# Patient Record
Sex: MALE | URBAN - METROPOLITAN AREA
[De-identification: names, ages, dates, MRNs, and addresses within clinical notes are randomized per-mention and may not be internally consistent; named-entity substitution may affect disease eponyms.]

---

## 2019-02-11 ENCOUNTER — APPOINTMENT (RX ONLY)
Dept: URBAN - METROPOLITAN AREA TELEMEDICINE 2 | Facility: TELEMEDICINE | Age: 59
Setting detail: DERMATOLOGY
End: 2019-02-11

## 2019-02-11 DIAGNOSIS — L85.3 XEROSIS CUTIS: ICD-10-CM

## 2019-02-11 DIAGNOSIS — D22 MELANOCYTIC NEVI: ICD-10-CM

## 2019-02-11 DIAGNOSIS — L81.4 OTHER MELANIN HYPERPIGMENTATION: ICD-10-CM

## 2019-02-11 DIAGNOSIS — D18.0 HEMANGIOMA: ICD-10-CM

## 2019-02-11 DIAGNOSIS — L73.8 OTHER SPECIFIED FOLLICULAR DISORDERS: ICD-10-CM

## 2019-02-11 DIAGNOSIS — L57.0 ACTINIC KERATOSIS: ICD-10-CM

## 2019-02-11 DIAGNOSIS — Z71.89 OTHER SPECIFIED COUNSELING: ICD-10-CM

## 2019-02-11 DIAGNOSIS — L82.1 OTHER SEBORRHEIC KERATOSIS: ICD-10-CM

## 2019-02-11 PROBLEM — D22.72 MELANOCYTIC NEVI OF LEFT LOWER LIMB, INCLUDING HIP: Status: ACTIVE | Noted: 2019-02-11

## 2019-02-11 PROBLEM — D22.71 MELANOCYTIC NEVI OF RIGHT LOWER LIMB, INCLUDING HIP: Status: ACTIVE | Noted: 2019-02-11

## 2019-02-11 PROBLEM — D48.5 NEOPLASM OF UNCERTAIN BEHAVIOR OF SKIN: Status: ACTIVE | Noted: 2019-02-11

## 2019-02-11 PROBLEM — D18.01 HEMANGIOMA OF SKIN AND SUBCUTANEOUS TISSUE: Status: ACTIVE | Noted: 2019-02-11

## 2019-02-11 PROBLEM — D22.5 MELANOCYTIC NEVI OF TRUNK: Status: ACTIVE | Noted: 2019-02-11

## 2019-02-11 PROBLEM — D23.61 OTHER BENIGN NEOPLASM OF SKIN OF RIGHT UPPER LIMB, INCLUDING SHOULDER: Status: ACTIVE | Noted: 2019-02-11

## 2019-02-11 PROCEDURE — ? COUNSELING

## 2019-02-11 PROCEDURE — 99203 OFFICE O/P NEW LOW 30 MIN: CPT | Mod: 25

## 2019-02-11 PROCEDURE — 11102 TANGNTL BX SKIN SINGLE LES: CPT

## 2019-02-11 PROCEDURE — ? SUNSCREEN RECOMMENDATIONS

## 2019-02-11 PROCEDURE — ? LIQUID NITROGEN

## 2019-02-11 PROCEDURE — 17000 DESTRUCT PREMALG LESION: CPT | Mod: 59

## 2019-02-11 PROCEDURE — ? BIOPSY BY SHAVE METHOD

## 2019-02-11 PROCEDURE — 17003 DESTRUCT PREMALG LES 2-14: CPT

## 2019-02-11 ASSESSMENT — LOCATION DETAILED DESCRIPTION DERM
LOCATION DETAILED: PERIUMBILICAL SKIN
LOCATION DETAILED: LEFT INFERIOR LATERAL NECK
LOCATION DETAILED: LEFT CENTRAL MALAR CHEEK
LOCATION DETAILED: RIGHT PROXIMAL POSTERIOR UPPER ARM
LOCATION DETAILED: RIGHT CLAVICULAR NECK
LOCATION DETAILED: RIGHT ANTERIOR SHOULDER
LOCATION DETAILED: LEFT MID-UPPER BACK
LOCATION DETAILED: RIGHT SUPERIOR UPPER BACK
LOCATION DETAILED: LEFT VENTRAL PROXIMAL FOREARM
LOCATION DETAILED: RIGHT PROXIMAL DORSAL FOREARM
LOCATION DETAILED: LEFT PROXIMAL POSTERIOR UPPER ARM
LOCATION DETAILED: LEFT MEDIAL INFERIOR CHEST
LOCATION DETAILED: STERNUM
LOCATION DETAILED: LEFT PROXIMAL CALF
LOCATION DETAILED: LEFT DISTAL DORSAL FOREARM
LOCATION DETAILED: LEFT ANTERIOR DISTAL THIGH
LOCATION DETAILED: LEFT ANTERIOR SHOULDER
LOCATION DETAILED: RIGHT SUPERIOR LATERAL UPPER BACK
LOCATION DETAILED: LEFT PROXIMAL DORSAL FOREARM
LOCATION DETAILED: RIGHT CENTRAL MALAR CHEEK
LOCATION DETAILED: LEFT ULNAR DORSAL HAND
LOCATION DETAILED: LEFT SUPERIOR LATERAL UPPER BACK
LOCATION DETAILED: RIGHT LATERAL INFERIOR CHEST
LOCATION DETAILED: LEFT LATERAL SUPERIOR CHEST
LOCATION DETAILED: LEFT SUPERIOR MEDIAL MIDBACK
LOCATION DETAILED: RIGHT MID-UPPER BACK
LOCATION DETAILED: RIGHT DISTAL CALF
LOCATION DETAILED: RIGHT ANTERIOR DISTAL THIGH
LOCATION DETAILED: RIGHT SUPERIOR MEDIAL MIDBACK
LOCATION DETAILED: LEFT FOREHEAD
LOCATION DETAILED: LEFT RADIAL DORSAL HAND

## 2019-02-11 ASSESSMENT — LOCATION SIMPLE DESCRIPTION DERM
LOCATION SIMPLE: LEFT LOWER BACK
LOCATION SIMPLE: LEFT CALF
LOCATION SIMPLE: LEFT SHOULDER
LOCATION SIMPLE: LEFT UPPER BACK
LOCATION SIMPLE: LEFT ANTERIOR NECK
LOCATION SIMPLE: LEFT HAND
LOCATION SIMPLE: LEFT FOREHEAD
LOCATION SIMPLE: LEFT THIGH
LOCATION SIMPLE: LEFT CHEEK
LOCATION SIMPLE: ABDOMEN
LOCATION SIMPLE: RIGHT LOWER BACK
LOCATION SIMPLE: RIGHT UPPER ARM
LOCATION SIMPLE: RIGHT ANTERIOR NECK
LOCATION SIMPLE: RIGHT SHOULDER
LOCATION SIMPLE: RIGHT CALF
LOCATION SIMPLE: CHEST
LOCATION SIMPLE: RIGHT UPPER BACK
LOCATION SIMPLE: RIGHT FOREARM
LOCATION SIMPLE: LEFT UPPER ARM
LOCATION SIMPLE: RIGHT THIGH
LOCATION SIMPLE: RIGHT CHEEK
LOCATION SIMPLE: LEFT FOREARM

## 2019-02-11 ASSESSMENT — LOCATION ZONE DERM
LOCATION ZONE: NECK
LOCATION ZONE: ARM
LOCATION ZONE: TRUNK
LOCATION ZONE: HAND
LOCATION ZONE: FACE
LOCATION ZONE: LEG

## 2019-02-11 NOTE — HPI: FULL BODY SKIN EXAMINATION
How Severe Are Your Spot(S)?: moderate
What Type Of Note Output Would You Prefer (Optional)?: Bullet Format
What Is The Reason For Today's Visit?: Full Body Skin Examination
What Is The Reason For Today's Visit? (Being Monitored For X): concerning skin lesions on an annual basis
Additional History: Mother had skin cancer, patient is unsure which kind.

## 2019-02-11 NOTE — PROCEDURE: BIOPSY BY SHAVE METHOD
Detail Level: Detailed
Biopsy Method: Dermablade
Bill For Surgical Tray: no
Lab: [id number]
Hemostasis: Aluminum Chloride
Curettage Text: The wound bed was treated with curettage after the biopsy was performed.
Was A Bandage Applied: Yes
Silver Nitrate Text: The wound bed was treated with silver nitrate after the biopsy was performed.
Notification Instructions: Patient will be notified of biopsy results. However, patient instructed to call the office if not contacted within 2 weeks.
Body Location Override (Optional - Billing Will Still Be Based On Selected Body Map Location If Applicable): right mid upper back
Cryotherapy Text: The wound bed was treated with cryotherapy after the biopsy was performed.
Anesthesia Type: 1% lidocaine with epinephrine
Lab Facility: [id number]
Consent: Written consent was obtained and risks were reviewed including but not limited to scarring, infection, bleeding, scabbing, incomplete removal, nerve damage and allergy to anesthesia.
Depth Of Biopsy: dermis
Billing Type: Third-Party Bill
Size Of Lesion In Cm: 0
Electrodesiccation Text: The wound bed was treated with electrodesiccation after the biopsy was performed.
Wound Care: Vaseline
Post-Care Instructions: I reviewed with the patient in detail post-care instructions. Patient is to keep the biopsy site dry overnight, and then apply bacitracin twice daily until healed. Patient may apply hydrogen peroxide soaks to remove any crusting.
Dressing: bandage
Anesthesia Volume In Cc: 0.5
Type Of Destruction Used: Curettage
Biopsy Type: H and E
Electrodesiccation And Curettage Text: The wound bed was treated with electrodesiccation and curettage after the biopsy was performed.

## 2020-02-17 ENCOUNTER — APPOINTMENT (RX ONLY)
Dept: URBAN - METROPOLITAN AREA TELEMEDICINE 2 | Facility: TELEMEDICINE | Age: 60
Setting detail: DERMATOLOGY
End: 2020-02-17

## 2020-02-17 DIAGNOSIS — L82.1 OTHER SEBORRHEIC KERATOSIS: ICD-10-CM

## 2020-02-17 DIAGNOSIS — L57.0 ACTINIC KERATOSIS: ICD-10-CM

## 2020-02-17 DIAGNOSIS — L85.3 XEROSIS CUTIS: ICD-10-CM

## 2020-02-17 DIAGNOSIS — L90.5 SCAR CONDITIONS AND FIBROSIS OF SKIN: ICD-10-CM

## 2020-02-17 DIAGNOSIS — L82.0 INFLAMED SEBORRHEIC KERATOSIS: ICD-10-CM

## 2020-02-17 DIAGNOSIS — L81.4 OTHER MELANIN HYPERPIGMENTATION: ICD-10-CM

## 2020-02-17 DIAGNOSIS — Z71.89 OTHER SPECIFIED COUNSELING: ICD-10-CM

## 2020-02-17 PROCEDURE — ? DEFER

## 2020-02-17 PROCEDURE — ? COUNSELING

## 2020-02-17 PROCEDURE — 17110 DESTRUCTION B9 LES UP TO 14: CPT

## 2020-02-17 PROCEDURE — ? SUNSCREEN RECOMMENDATIONS

## 2020-02-17 PROCEDURE — ? LIQUID NITROGEN

## 2020-02-17 PROCEDURE — 99213 OFFICE O/P EST LOW 20 MIN: CPT | Mod: 25

## 2020-02-17 ASSESSMENT — LOCATION SIMPLE DESCRIPTION DERM
LOCATION SIMPLE: LEFT SHOULDER
LOCATION SIMPLE: RIGHT FOREHEAD
LOCATION SIMPLE: RIGHT CHEEK
LOCATION SIMPLE: RIGHT FOREARM
LOCATION SIMPLE: RIGHT UPPER BACK
LOCATION SIMPLE: LEFT FOREARM

## 2020-02-17 ASSESSMENT — LOCATION DETAILED DESCRIPTION DERM
LOCATION DETAILED: RIGHT PROXIMAL DORSAL FOREARM
LOCATION DETAILED: LEFT POSTERIOR SHOULDER
LOCATION DETAILED: LEFT PROXIMAL DORSAL FOREARM
LOCATION DETAILED: RIGHT FOREHEAD
LOCATION DETAILED: LEFT PROXIMAL RADIAL DORSAL FOREARM
LOCATION DETAILED: RIGHT DISTAL RADIAL DORSAL FOREARM
LOCATION DETAILED: RIGHT INFERIOR MEDIAL UPPER BACK
LOCATION DETAILED: RIGHT INFERIOR CENTRAL MALAR CHEEK
LOCATION DETAILED: RIGHT SUPERIOR UPPER BACK
LOCATION DETAILED: RIGHT MID-UPPER BACK
LOCATION DETAILED: LEFT DISTAL DORSAL FOREARM

## 2020-02-17 ASSESSMENT — LOCATION ZONE DERM
LOCATION ZONE: TRUNK
LOCATION ZONE: FACE
LOCATION ZONE: ARM

## 2020-02-17 NOTE — PROCEDURE: LIQUID NITROGEN
Number Of Freeze-Thaw Cycles: 3 freeze-thaw cycles
Post-Care Instructions: I reviewed with the patient in detail post-care instructions. Patient is to wear sunprotection, and avoid picking at any of the treated lesions. Pt may apply Vaseline to crusted or scabbing areas.
Detail Level: Simple
Consent: The patient's consent was obtained including but not limited to risks of crusting, scabbing, blistering, scarring, darker or lighter pigmentary change, recurrence, incomplete removal and infection.
Add 52 Modifier (Optional): no
Medical Necessity Clause: This procedure was medically necessary because the lesions that were treated were:
Include Z78.9 (Other Specified Conditions Influencing Health Status) As An Associated Diagnosis?: Yes
Medical Necessity Information: It is in your best interest to select a reason for this procedure from the list below. All of these items fulfill various CMS LCD requirements except the new and changing color options.

## 2020-03-04 ENCOUNTER — APPOINTMENT (RX ONLY)
Dept: URBAN - METROPOLITAN AREA TELEMEDICINE 2 | Facility: TELEMEDICINE | Age: 60
Setting detail: DERMATOLOGY
End: 2020-03-04

## 2020-03-04 DIAGNOSIS — L81.8 OTHER SPECIFIED DISORDERS OF PIGMENTATION: ICD-10-CM

## 2020-03-04 DIAGNOSIS — L57.0 ACTINIC KERATOSIS: ICD-10-CM

## 2020-03-04 PROCEDURE — ? COUNSELING

## 2020-03-04 PROCEDURE — ? PATIENT SPECIFIC COUNSELING

## 2020-03-04 PROCEDURE — ? LIQUID NITROGEN

## 2020-03-04 PROCEDURE — 99212 OFFICE O/P EST SF 10 MIN: CPT | Mod: 25

## 2020-03-04 PROCEDURE — 17004 DESTROY PREMAL LESIONS 15/>: CPT

## 2020-03-04 ASSESSMENT — LOCATION SIMPLE DESCRIPTION DERM
LOCATION SIMPLE: RIGHT FOREARM
LOCATION SIMPLE: LEFT CHEEK
LOCATION SIMPLE: LEFT HAND
LOCATION SIMPLE: RIGHT CHEEK
LOCATION SIMPLE: RIGHT HAND
LOCATION SIMPLE: LEFT UPPER BACK
LOCATION SIMPLE: RIGHT FOREHEAD
LOCATION SIMPLE: LEFT FOREARM
LOCATION SIMPLE: LEFT ZYGOMA
LOCATION SIMPLE: RIGHT WRIST

## 2020-03-04 ASSESSMENT — LOCATION ZONE DERM
LOCATION ZONE: HAND
LOCATION ZONE: TRUNK
LOCATION ZONE: FACE
LOCATION ZONE: ARM

## 2020-03-04 ASSESSMENT — LOCATION DETAILED DESCRIPTION DERM
LOCATION DETAILED: RIGHT INFERIOR LATERAL FOREHEAD
LOCATION DETAILED: LEFT MID-UPPER BACK
LOCATION DETAILED: RIGHT RADIAL DORSAL HAND
LOCATION DETAILED: RIGHT CENTRAL MALAR CHEEK
LOCATION DETAILED: RIGHT LATERAL DORSAL WRIST
LOCATION DETAILED: RIGHT SUPERIOR CENTRAL MALAR CHEEK
LOCATION DETAILED: LEFT MEDIAL ZYGOMA
LOCATION DETAILED: LEFT SUPERIOR MEDIAL MALAR CHEEK
LOCATION DETAILED: LEFT INFERIOR MEDIAL MALAR CHEEK
LOCATION DETAILED: RIGHT PROXIMAL DORSAL FOREARM
LOCATION DETAILED: LEFT DISTAL DORSAL FOREARM
LOCATION DETAILED: LEFT RADIAL DORSAL HAND
LOCATION DETAILED: LEFT ULNAR DORSAL HAND
LOCATION DETAILED: RIGHT SUPERIOR FOREHEAD
LOCATION DETAILED: RIGHT DISTAL DORSAL FOREARM

## 2020-03-04 NOTE — PROCEDURE: LIQUID NITROGEN
Render Post-Care Instructions In Note?: yes
Detail Level: Zone
Consent: The patient's consent was obtained including but not limited to risks of crusting, scabbing, blistering, scarring, darker or lighter pigmentary change, recurrence, incomplete removal and infection.
Duration Of Freeze Thaw-Cycle (Seconds): 5
Render Note In Bullet Format When Appropriate: No
Post-Care Instructions: I reviewed with the patient in detail post-care instructions. Patient is to wear sunprotection, and avoid picking at any of the treated lesions. Pt may apply Vaseline to crusted or scabbing areas.
Number Of Freeze-Thaw Cycles: 3 freeze-thaw cycles

## 2021-03-04 ENCOUNTER — APPOINTMENT (RX ONLY)
Dept: URBAN - METROPOLITAN AREA TELEMEDICINE 2 | Facility: TELEMEDICINE | Age: 61
Setting detail: DERMATOLOGY
End: 2021-03-04

## 2021-03-04 DIAGNOSIS — L82.1 OTHER SEBORRHEIC KERATOSIS: ICD-10-CM

## 2021-03-04 DIAGNOSIS — L81.4 OTHER MELANIN HYPERPIGMENTATION: ICD-10-CM

## 2021-03-04 DIAGNOSIS — Z80.8 FAMILY HISTORY OF MALIGNANT NEOPLASM OF OTHER ORGANS OR SYSTEMS: ICD-10-CM

## 2021-03-04 DIAGNOSIS — L57.0 ACTINIC KERATOSIS: ICD-10-CM

## 2021-03-04 DIAGNOSIS — L57.3 POIKILODERMA OF CIVATTE: ICD-10-CM

## 2021-03-04 DIAGNOSIS — L73.8 OTHER SPECIFIED FOLLICULAR DISORDERS: ICD-10-CM

## 2021-03-04 DIAGNOSIS — D18.0 HEMANGIOMA: ICD-10-CM

## 2021-03-04 DIAGNOSIS — Q826 OTHER SPECIFIED ANOMALIES OF SKIN: ICD-10-CM

## 2021-03-04 DIAGNOSIS — Q80.8 OTHER CONGENITAL ICHTHYOSIS: ICD-10-CM

## 2021-03-04 DIAGNOSIS — R60.0 LOCALIZED EDEMA: ICD-10-CM

## 2021-03-04 DIAGNOSIS — L71.8 OTHER ROSACEA: ICD-10-CM

## 2021-03-04 DIAGNOSIS — Q819 OTHER SPECIFIED ANOMALIES OF SKIN: ICD-10-CM

## 2021-03-04 DIAGNOSIS — L21.8 OTHER SEBORRHEIC DERMATITIS: ICD-10-CM

## 2021-03-04 DIAGNOSIS — Z71.89 OTHER SPECIFIED COUNSELING: ICD-10-CM

## 2021-03-04 DIAGNOSIS — Q828 OTHER SPECIFIED ANOMALIES OF SKIN: ICD-10-CM

## 2021-03-04 PROBLEM — D18.01 HEMANGIOMA OF SKIN AND SUBCUTANEOUS TISSUE: Status: ACTIVE | Noted: 2021-03-04

## 2021-03-04 PROBLEM — L85.8 OTHER SPECIFIED EPIDERMAL THICKENING: Status: ACTIVE | Noted: 2021-03-04

## 2021-03-04 PROCEDURE — 17003 DESTRUCT PREMALG LES 2-14: CPT

## 2021-03-04 PROCEDURE — ? COUNSELING

## 2021-03-04 PROCEDURE — ? LIQUID NITROGEN

## 2021-03-04 PROCEDURE — 99213 OFFICE O/P EST LOW 20 MIN: CPT | Mod: 25

## 2021-03-04 PROCEDURE — ? OTHER

## 2021-03-04 PROCEDURE — 17000 DESTRUCT PREMALG LESION: CPT

## 2021-03-04 ASSESSMENT — LOCATION DETAILED DESCRIPTION DERM
LOCATION DETAILED: RIGHT DISTAL LATERAL CALF
LOCATION DETAILED: RIGHT CENTRAL MALAR CHEEK
LOCATION DETAILED: LEFT SUPERIOR LATERAL MALAR CHEEK
LOCATION DETAILED: LEFT PROXIMAL POSTERIOR UPPER ARM
LOCATION DETAILED: LEFT ANTERIOR DISTAL UPPER ARM
LOCATION DETAILED: RIGHT PROXIMAL RADIAL DORSAL FOREARM
LOCATION DETAILED: LEFT SUPERIOR ANTERIOR NECK
LOCATION DETAILED: LEFT RADIAL DORSAL HAND
LOCATION DETAILED: LEFT PROXIMAL PRETIBIAL REGION
LOCATION DETAILED: LEFT DISTAL DORSAL FOREARM
LOCATION DETAILED: RIGHT PROXIMAL CALF
LOCATION DETAILED: LEFT MEDIAL SUPERIOR CHEST
LOCATION DETAILED: LEFT ANTERIOR PROXIMAL THIGH
LOCATION DETAILED: LEFT DISTAL POSTERIOR THIGH
LOCATION DETAILED: LEFT DISTAL POSTERIOR UPPER ARM
LOCATION DETAILED: RIGHT DISTAL DORSAL FOREARM
LOCATION DETAILED: LEFT ANTERIOR DISTAL THIGH
LOCATION DETAILED: RIGHT RADIAL DORSAL HAND
LOCATION DETAILED: LEFT ELBOW
LOCATION DETAILED: RIGHT MID TEMPLE
LOCATION DETAILED: RIGHT SUPERIOR LATERAL MALAR CHEEK
LOCATION DETAILED: LEFT LATERAL MALAR CHEEK
LOCATION DETAILED: LEFT CENTRAL MALAR CHEEK
LOCATION DETAILED: LEFT PROXIMAL CALF
LOCATION DETAILED: RIGHT ANTERIOR DISTAL UPPER ARM
LOCATION DETAILED: LEFT DISTAL CALF
LOCATION DETAILED: LEFT DISTAL PRETIBIAL REGION
LOCATION DETAILED: RIGHT DISTAL POSTERIOR THIGH

## 2021-03-04 ASSESSMENT — LOCATION SIMPLE DESCRIPTION DERM
LOCATION SIMPLE: RIGHT TEMPLE
LOCATION SIMPLE: LEFT HAND
LOCATION SIMPLE: RIGHT CALF
LOCATION SIMPLE: CHEST
LOCATION SIMPLE: LEFT ANTERIOR NECK
LOCATION SIMPLE: LEFT POSTERIOR THIGH
LOCATION SIMPLE: LEFT FOREARM
LOCATION SIMPLE: RIGHT POSTERIOR THIGH
LOCATION SIMPLE: RIGHT CHEEK
LOCATION SIMPLE: LEFT THIGH
LOCATION SIMPLE: LEFT ELBOW
LOCATION SIMPLE: LEFT CHEEK
LOCATION SIMPLE: LEFT UPPER ARM
LOCATION SIMPLE: RIGHT HAND
LOCATION SIMPLE: LEFT CALF
LOCATION SIMPLE: RIGHT UPPER ARM
LOCATION SIMPLE: RIGHT FOREARM
LOCATION SIMPLE: LEFT PRETIBIAL REGION

## 2021-03-04 ASSESSMENT — LOCATION ZONE DERM
LOCATION ZONE: NECK
LOCATION ZONE: TRUNK
LOCATION ZONE: HAND
LOCATION ZONE: LEG
LOCATION ZONE: FACE
LOCATION ZONE: ARM

## 2021-03-04 NOTE — PROCEDURE: OTHER
Render Risk Assessment In Note?: no
Other (Free Text): Mother
Detail Level: Detailed
Note Text (......Xxx Chief Complaint.): This diagnosis correlates with the

## 2021-03-04 NOTE — PROCEDURE: LIQUID NITROGEN
Render Post-Care Instructions In Note?: yes
Consent: The patient's consent was obtained including but not limited to risks of crusting, scabbing, blistering, scarring, darker or lighter pigmentary change, recurrence, incomplete removal and infection.
Detail Level: Zone
Render Note In Bullet Format When Appropriate: No
Number Of Freeze-Thaw Cycles: 3 freeze-thaw cycles
Post-Care Instructions: I reviewed with the patient in detail post-care instructions. Patient is to wear sunprotection, and avoid picking at any of the treated lesions. Pt may apply Vaseline to crusted or scabbing areas.
Duration Of Freeze Thaw-Cycle (Seconds): 5

## 2022-03-04 ENCOUNTER — APPOINTMENT (RX ONLY)
Dept: URBAN - METROPOLITAN AREA TELEMEDICINE 2 | Facility: TELEMEDICINE | Age: 62
Setting detail: DERMATOLOGY
End: 2022-03-04

## 2022-03-04 DIAGNOSIS — Q826 OTHER SPECIFIED ANOMALIES OF SKIN: ICD-10-CM

## 2022-03-04 DIAGNOSIS — L71.8 OTHER ROSACEA: ICD-10-CM

## 2022-03-04 DIAGNOSIS — L82.1 OTHER SEBORRHEIC KERATOSIS: ICD-10-CM

## 2022-03-04 DIAGNOSIS — L81.4 OTHER MELANIN HYPERPIGMENTATION: ICD-10-CM

## 2022-03-04 DIAGNOSIS — L73.8 OTHER SPECIFIED FOLLICULAR DISORDERS: ICD-10-CM

## 2022-03-04 DIAGNOSIS — D18.0 HEMANGIOMA: ICD-10-CM

## 2022-03-04 DIAGNOSIS — Z71.89 OTHER SPECIFIED COUNSELING: ICD-10-CM

## 2022-03-04 DIAGNOSIS — L28.0 LICHEN SIMPLEX CHRONICUS: ICD-10-CM

## 2022-03-04 DIAGNOSIS — Q819 OTHER SPECIFIED ANOMALIES OF SKIN: ICD-10-CM

## 2022-03-04 DIAGNOSIS — L57.3 POIKILODERMA OF CIVATTE: ICD-10-CM

## 2022-03-04 DIAGNOSIS — L21.8 OTHER SEBORRHEIC DERMATITIS: ICD-10-CM

## 2022-03-04 DIAGNOSIS — Q828 OTHER SPECIFIED ANOMALIES OF SKIN: ICD-10-CM

## 2022-03-04 DIAGNOSIS — Z80.8 FAMILY HISTORY OF MALIGNANT NEOPLASM OF OTHER ORGANS OR SYSTEMS: ICD-10-CM

## 2022-03-04 DIAGNOSIS — D485 NEOPLASM OF UNCERTAIN BEHAVIOR OF SKIN: ICD-10-CM

## 2022-03-04 DIAGNOSIS — L57.0 ACTINIC KERATOSIS: ICD-10-CM

## 2022-03-04 DIAGNOSIS — Q80.8 OTHER CONGENITAL ICHTHYOSIS: ICD-10-CM

## 2022-03-04 PROBLEM — L85.8 OTHER SPECIFIED EPIDERMAL THICKENING: Status: ACTIVE | Noted: 2022-03-04

## 2022-03-04 PROBLEM — D48.5 NEOPLASM OF UNCERTAIN BEHAVIOR OF SKIN: Status: ACTIVE | Noted: 2022-03-04

## 2022-03-04 PROBLEM — D18.01 HEMANGIOMA OF SKIN AND SUBCUTANEOUS TISSUE: Status: ACTIVE | Noted: 2022-03-04

## 2022-03-04 PROCEDURE — ? BIOPSY BY SHAVE METHOD

## 2022-03-04 PROCEDURE — 17000 DESTRUCT PREMALG LESION: CPT | Mod: 59

## 2022-03-04 PROCEDURE — ? LIQUID NITROGEN

## 2022-03-04 PROCEDURE — 11102 TANGNTL BX SKIN SINGLE LES: CPT

## 2022-03-04 PROCEDURE — 99213 OFFICE O/P EST LOW 20 MIN: CPT | Mod: 25

## 2022-03-04 PROCEDURE — ? COUNSELING

## 2022-03-04 PROCEDURE — ? PATIENT SPECIFIC COUNSELING

## 2022-03-04 PROCEDURE — 17003 DESTRUCT PREMALG LES 2-14: CPT

## 2022-03-04 PROCEDURE — ? OTHER

## 2022-03-04 ASSESSMENT — LOCATION DETAILED DESCRIPTION DERM
LOCATION DETAILED: RIGHT SUPERIOR UPPER BACK
LOCATION DETAILED: LEFT ANTERIOR DISTAL UPPER ARM
LOCATION DETAILED: RIGHT CENTRAL MALAR CHEEK
LOCATION DETAILED: LEFT SUPERIOR ANTERIOR NECK
LOCATION DETAILED: RIGHT SUPERIOR LATERAL MALAR CHEEK
LOCATION DETAILED: LEFT LATERAL INFERIOR CHEST
LOCATION DETAILED: LEFT MEDIAL SUPERIOR CHEST
LOCATION DETAILED: RIGHT DISTAL POSTERIOR THIGH
LOCATION DETAILED: PERIUMBILICAL SKIN
LOCATION DETAILED: RIGHT DISTAL CALF
LOCATION DETAILED: LEFT ANTERIOR DISTAL THIGH
LOCATION DETAILED: LEFT DISTAL CALF
LOCATION DETAILED: LEFT SUPERIOR LATERAL MALAR CHEEK
LOCATION DETAILED: RIGHT DISTAL DORSAL FOREARM
LOCATION DETAILED: RIGHT LATERAL MALAR CHEEK
LOCATION DETAILED: RIGHT ANTERIOR PROXIMAL THIGH
LOCATION DETAILED: LEFT POSTERIOR ANKLE
LOCATION DETAILED: LEFT DISTAL DORSAL FOREARM
LOCATION DETAILED: RIGHT ANTERIOR DISTAL UPPER ARM
LOCATION DETAILED: RIGHT SUPERIOR MEDIAL LOWER BACK
LOCATION DETAILED: LEFT PROXIMAL POSTERIOR UPPER ARM
LOCATION DETAILED: RIGHT INFERIOR FOREHEAD
LOCATION DETAILED: RIGHT PROXIMAL DORSAL FOREARM
LOCATION DETAILED: LEFT DISTAL POSTERIOR THIGH
LOCATION DETAILED: RIGHT DISTAL RADIAL DORSAL FOREARM
LOCATION DETAILED: LEFT PROXIMAL PRETIBIAL REGION
LOCATION DETAILED: RIGHT INFERIOR POSTERIOR NECK
LOCATION DETAILED: LEFT CENTRAL MALAR CHEEK
LOCATION DETAILED: LEFT PROXIMAL DORSAL FOREARM
LOCATION DETAILED: LEFT INFERIOR CENTRAL MALAR CHEEK
LOCATION DETAILED: LEFT MEDIAL INFERIOR CHEST
LOCATION DETAILED: RIGHT PROXIMAL CALF
LOCATION DETAILED: LEFT SUPERIOR MEDIAL UPPER BACK
LOCATION DETAILED: RIGHT DORSAL WRIST
LOCATION DETAILED: RIGHT PROXIMAL PRETIBIAL REGION

## 2022-03-04 ASSESSMENT — LOCATION SIMPLE DESCRIPTION DERM
LOCATION SIMPLE: RIGHT THIGH
LOCATION SIMPLE: LEFT ANKLE
LOCATION SIMPLE: RIGHT UPPER ARM
LOCATION SIMPLE: LEFT FOREARM
LOCATION SIMPLE: RIGHT LOWER BACK
LOCATION SIMPLE: CHEST
LOCATION SIMPLE: POSTERIOR NECK
LOCATION SIMPLE: RIGHT FOREHEAD
LOCATION SIMPLE: LEFT UPPER BACK
LOCATION SIMPLE: LEFT CHEEK
LOCATION SIMPLE: LEFT PRETIBIAL REGION
LOCATION SIMPLE: RIGHT POSTERIOR THIGH
LOCATION SIMPLE: LEFT ANTERIOR NECK
LOCATION SIMPLE: LEFT CALF
LOCATION SIMPLE: LEFT POSTERIOR THIGH
LOCATION SIMPLE: RIGHT UPPER BACK
LOCATION SIMPLE: RIGHT CALF
LOCATION SIMPLE: RIGHT PRETIBIAL REGION
LOCATION SIMPLE: RIGHT CHEEK
LOCATION SIMPLE: LEFT THIGH
LOCATION SIMPLE: LEFT UPPER ARM
LOCATION SIMPLE: RIGHT WRIST
LOCATION SIMPLE: RIGHT FOREARM
LOCATION SIMPLE: ABDOMEN

## 2022-03-04 ASSESSMENT — LOCATION ZONE DERM
LOCATION ZONE: TRUNK
LOCATION ZONE: FACE
LOCATION ZONE: ARM
LOCATION ZONE: LEG
LOCATION ZONE: NECK

## 2022-03-04 NOTE — PROCEDURE: BIOPSY BY SHAVE METHOD
Detail Level: Detailed
Depth Of Biopsy: dermis
Was A Bandage Applied: Yes
Size Of Lesion In Cm: 0
Biopsy Type: H and E
Biopsy Method: Dermablade
Anesthesia Type: 1% lidocaine with epinephrine and a 1:10 solution of 8.4% sodium bicarbonate
Anesthesia Volume In Cc: 0.5
Hemostasis: Drysol
Wound Care: Vaseline
Dressing: bandage
Destruction After The Procedure: No
Type Of Destruction Used: Curettage
Curettage Text: The wound bed was treated with curettage after the biopsy was performed.
Cryotherapy Text: The wound bed was treated with cryotherapy after the biopsy was performed.
Electrodesiccation Text: The wound bed was treated with electrodesiccation after the biopsy was performed.
Electrodesiccation And Curettage Text: The wound bed was treated with electrodesiccation and curettage after the biopsy was performed.
Silver Nitrate Text: The wound bed was treated with silver nitrate after the biopsy was performed.
Lab: [id number]
Lab Facility: [id number]
Consent: Written consent was obtained and risks were reviewed including but not limited to scarring, infection, bleeding, scabbing, incomplete removal, nerve damage and allergy to anesthesia.
Post-Care Instructions: I reviewed with the patient in detail post-care instructions. Patient is to keep the biopsy site dry overnight, and then apply bacitracin twice daily until healed. Patient may apply hydrogen peroxide soaks to remove any crusting.
Notification Instructions: Patient will be notified of biopsy results. However, patient instructed to call the office if not contacted within 2 weeks.
Billing Type: Third-Party Bill
Information: Selecting Yes will display possible errors in your note based on the variables you have selected. This validation is only offered as a suggestion for you. PLEASE NOTE THAT THE VALIDATION TEXT WILL BE REMOVED WHEN YOU FINALIZE YOUR NOTE. IF YOU WANT TO FAX A PRELIMINARY NOTE YOU WILL NEED TO TOGGLE THIS TO 'NO' IF YOU DO NOT WANT IT IN YOUR FAXED NOTE.

## 2022-03-04 NOTE — PROCEDURE: LIQUID NITROGEN
Duration Of Freeze Thaw-Cycle (Seconds): 5
Post-Care Instructions: I reviewed with the patient in detail post-care instructions. Patient is to wear sunprotection, and avoid picking at any of the treated lesions. Pt may apply Vaseline to crusted or scabbing areas.
Render Note In Bullet Format When Appropriate: No
Show Applicator Variable?: Yes
Detail Level: Zone
Number Of Freeze-Thaw Cycles: 3 freeze-thaw cycles
Consent: The patient's consent was obtained including but not limited to risks of crusting, scabbing, blistering, scarring, darker or lighter pigmentary change, recurrence, incomplete removal and infection.

## 2022-04-07 ENCOUNTER — APPOINTMENT (RX ONLY)
Dept: URBAN - METROPOLITAN AREA TELEMEDICINE 2 | Facility: TELEMEDICINE | Age: 62
Setting detail: DERMATOLOGY
End: 2022-04-07

## 2022-04-07 DIAGNOSIS — L57.0 ACTINIC KERATOSIS: ICD-10-CM

## 2022-04-07 DIAGNOSIS — L82.0 INFLAMED SEBORRHEIC KERATOSIS: ICD-10-CM

## 2022-04-07 DIAGNOSIS — L82.1 OTHER SEBORRHEIC KERATOSIS: ICD-10-CM

## 2022-04-07 PROCEDURE — 17110 DESTRUCTION B9 LES UP TO 14: CPT

## 2022-04-07 PROCEDURE — 99212 OFFICE O/P EST SF 10 MIN: CPT | Mod: 25

## 2022-04-07 PROCEDURE — 17000 DESTRUCT PREMALG LESION: CPT | Mod: 59

## 2022-04-07 PROCEDURE — ? LIQUID NITROGEN

## 2022-04-07 PROCEDURE — ? OTHER

## 2022-04-07 PROCEDURE — ? COUNSELING

## 2022-04-07 ASSESSMENT — LOCATION DETAILED DESCRIPTION DERM
LOCATION DETAILED: LEFT LATERAL INFERIOR CHEST
LOCATION DETAILED: LEFT ANTERIOR MEDIAL DISTAL UPPER ARM

## 2022-04-07 ASSESSMENT — LOCATION ZONE DERM
LOCATION ZONE: ARM
LOCATION ZONE: TRUNK

## 2022-04-07 ASSESSMENT — LOCATION SIMPLE DESCRIPTION DERM
LOCATION SIMPLE: LEFT UPPER ARM
LOCATION SIMPLE: CHEST

## 2022-04-07 NOTE — PROCEDURE: OTHER
Render Risk Assessment In Note?: no
Note Text (......Xxx Chief Complaint.): This diagnosis correlates with the
Other (Free Text): Bx proven ak 3/4/22 , Accession #[id number]
Detail Level: Detailed

## 2022-04-07 NOTE — PROCEDURE: LIQUID NITROGEN
Post-Care Instructions: I reviewed with the patient in detail post-care instructions. Patient is to wear sunprotection, and avoid picking at any of the treated lesions. Pt may apply Vaseline to crusted or scabbing areas.
Show Applicator Variable?: Yes
Duration Of Freeze Thaw-Cycle (Seconds): 5
Number Of Freeze-Thaw Cycles: 3 freeze-thaw cycles
Consent: The patient's consent was obtained including but not limited to risks of crusting, scabbing, blistering, scarring, darker or lighter pigmentary change, recurrence, incomplete removal and infection.
Render Note In Bullet Format When Appropriate: No
Detail Level: Zone
Detail Level: Simple
Medical Necessity Clause: This procedure was medically necessary because the lesions that were treated were:
Spray Paint Text: The liquid nitrogen was applied to the skin utilizing a spray paint frosting technique.
Medical Necessity Information: It is in your best interest to select a reason for this procedure from the list below. All of these items fulfill various CMS LCD requirements except the new and changing color options.

## 2023-03-06 ENCOUNTER — APPOINTMENT (RX ONLY)
Dept: URBAN - METROPOLITAN AREA TELEMEDICINE 2 | Facility: TELEMEDICINE | Age: 63
Setting detail: DERMATOLOGY
End: 2023-03-06

## 2023-03-06 DIAGNOSIS — Q819 OTHER SPECIFIED ANOMALIES OF SKIN: ICD-10-CM

## 2023-03-06 DIAGNOSIS — D22 MELANOCYTIC NEVI: ICD-10-CM

## 2023-03-06 DIAGNOSIS — Q80.8 OTHER CONGENITAL ICHTHYOSIS: ICD-10-CM

## 2023-03-06 DIAGNOSIS — L71.8 OTHER ROSACEA: ICD-10-CM

## 2023-03-06 DIAGNOSIS — L57.3 POIKILODERMA OF CIVATTE: ICD-10-CM

## 2023-03-06 DIAGNOSIS — L21.8 OTHER SEBORRHEIC DERMATITIS: ICD-10-CM

## 2023-03-06 DIAGNOSIS — Q828 OTHER SPECIFIED ANOMALIES OF SKIN: ICD-10-CM

## 2023-03-06 DIAGNOSIS — Z71.89 OTHER SPECIFIED COUNSELING: ICD-10-CM

## 2023-03-06 DIAGNOSIS — D485 NEOPLASM OF UNCERTAIN BEHAVIOR OF SKIN: ICD-10-CM

## 2023-03-06 DIAGNOSIS — Z80.8 FAMILY HISTORY OF MALIGNANT NEOPLASM OF OTHER ORGANS OR SYSTEMS: ICD-10-CM

## 2023-03-06 DIAGNOSIS — D18.0 HEMANGIOMA: ICD-10-CM

## 2023-03-06 DIAGNOSIS — L57.0 ACTINIC KERATOSIS: ICD-10-CM

## 2023-03-06 DIAGNOSIS — L82.1 OTHER SEBORRHEIC KERATOSIS: ICD-10-CM

## 2023-03-06 DIAGNOSIS — Q826 OTHER SPECIFIED ANOMALIES OF SKIN: ICD-10-CM

## 2023-03-06 DIAGNOSIS — L82.0 INFLAMED SEBORRHEIC KERATOSIS: ICD-10-CM

## 2023-03-06 DIAGNOSIS — L81.4 OTHER MELANIN HYPERPIGMENTATION: ICD-10-CM

## 2023-03-06 PROBLEM — D48.5 NEOPLASM OF UNCERTAIN BEHAVIOR OF SKIN: Status: ACTIVE | Noted: 2023-03-06

## 2023-03-06 PROBLEM — D18.01 HEMANGIOMA OF SKIN AND SUBCUTANEOUS TISSUE: Status: ACTIVE | Noted: 2023-03-06

## 2023-03-06 PROBLEM — L85.8 OTHER SPECIFIED EPIDERMAL THICKENING: Status: ACTIVE | Noted: 2023-03-06

## 2023-03-06 PROBLEM — D22.5 MELANOCYTIC NEVI OF TRUNK: Status: ACTIVE | Noted: 2023-03-06

## 2023-03-06 PROCEDURE — 17003 DESTRUCT PREMALG LES 2-14: CPT | Mod: 59

## 2023-03-06 PROCEDURE — 17000 DESTRUCT PREMALG LESION: CPT | Mod: 59

## 2023-03-06 PROCEDURE — ? BIOPSY BY SHAVE METHOD

## 2023-03-06 PROCEDURE — 17110 DESTRUCTION B9 LES UP TO 14: CPT

## 2023-03-06 PROCEDURE — 11102 TANGNTL BX SKIN SINGLE LES: CPT | Mod: 59

## 2023-03-06 PROCEDURE — 99213 OFFICE O/P EST LOW 20 MIN: CPT | Mod: 25

## 2023-03-06 PROCEDURE — ? LIQUID NITROGEN

## 2023-03-06 PROCEDURE — ? OTHER

## 2023-03-06 PROCEDURE — ? COUNSELING

## 2023-03-06 ASSESSMENT — LOCATION SIMPLE DESCRIPTION DERM
LOCATION SIMPLE: LEFT ANKLE
LOCATION SIMPLE: RIGHT POSTERIOR THIGH
LOCATION SIMPLE: RIGHT FOREHEAD
LOCATION SIMPLE: RIGHT FOREARM
LOCATION SIMPLE: RIGHT LOWER BACK
LOCATION SIMPLE: RIGHT THIGH
LOCATION SIMPLE: LEFT FOREARM
LOCATION SIMPLE: LEFT PRETIBIAL REGION
LOCATION SIMPLE: LEFT UPPER ARM
LOCATION SIMPLE: RIGHT CALF
LOCATION SIMPLE: RIGHT WRIST
LOCATION SIMPLE: LEFT THIGH
LOCATION SIMPLE: LEFT ANTERIOR NECK
LOCATION SIMPLE: RIGHT CHEEK
LOCATION SIMPLE: LEFT CALF
LOCATION SIMPLE: LEFT ZYGOMA
LOCATION SIMPLE: LEFT UPPER BACK
LOCATION SIMPLE: POSTERIOR NECK
LOCATION SIMPLE: LEFT CHEEK
LOCATION SIMPLE: LEFT POSTERIOR THIGH
LOCATION SIMPLE: RIGHT UPPER ARM
LOCATION SIMPLE: ABDOMEN
LOCATION SIMPLE: RIGHT PRETIBIAL REGION
LOCATION SIMPLE: RIGHT UPPER BACK
LOCATION SIMPLE: CHEST

## 2023-03-06 ASSESSMENT — LOCATION ZONE DERM
LOCATION ZONE: LEG
LOCATION ZONE: ARM
LOCATION ZONE: FACE
LOCATION ZONE: NECK
LOCATION ZONE: TRUNK

## 2023-03-06 ASSESSMENT — LOCATION DETAILED DESCRIPTION DERM
LOCATION DETAILED: LEFT ANTERIOR DISTAL THIGH
LOCATION DETAILED: LEFT PROXIMAL POSTERIOR UPPER ARM
LOCATION DETAILED: LEFT PROXIMAL PRETIBIAL REGION
LOCATION DETAILED: RIGHT PROXIMAL CALF
LOCATION DETAILED: LEFT ANTERIOR DISTAL UPPER ARM
LOCATION DETAILED: LEFT SUPERIOR MEDIAL MALAR CHEEK
LOCATION DETAILED: RIGHT DISTAL CALF
LOCATION DETAILED: RIGHT INFERIOR POSTERIOR NECK
LOCATION DETAILED: RIGHT RADIAL VENTRAL WRIST
LOCATION DETAILED: LEFT DISTAL RADIAL DORSAL FOREARM
LOCATION DETAILED: RIGHT ANTERIOR PROXIMAL THIGH
LOCATION DETAILED: LEFT MEDIAL INFERIOR CHEST
LOCATION DETAILED: LEFT SUPERIOR MEDIAL UPPER BACK
LOCATION DETAILED: RIGHT MEDIAL MALAR CHEEK
LOCATION DETAILED: LEFT POSTERIOR ANKLE
LOCATION DETAILED: RIGHT INFERIOR LATERAL FOREHEAD
LOCATION DETAILED: RIGHT ANTERIOR DISTAL UPPER ARM
LOCATION DETAILED: RIGHT DISTAL RADIAL DORSAL FOREARM
LOCATION DETAILED: LEFT DISTAL POSTERIOR THIGH
LOCATION DETAILED: RIGHT PROXIMAL PRETIBIAL REGION
LOCATION DETAILED: LEFT CENTRAL MALAR CHEEK
LOCATION DETAILED: LEFT DISTAL CALF
LOCATION DETAILED: LEFT DISTAL DORSAL FOREARM
LOCATION DETAILED: LEFT PROXIMAL DORSAL FOREARM
LOCATION DETAILED: RIGHT PROXIMAL DORSAL FOREARM
LOCATION DETAILED: RIGHT LATERAL FOREHEAD
LOCATION DETAILED: RIGHT DISTAL POSTERIOR THIGH
LOCATION DETAILED: PERIUMBILICAL SKIN
LOCATION DETAILED: RIGHT INFERIOR FOREHEAD
LOCATION DETAILED: LEFT INFERIOR CENTRAL MALAR CHEEK
LOCATION DETAILED: LEFT SUPERIOR ANTERIOR NECK
LOCATION DETAILED: RIGHT SUPERIOR UPPER BACK
LOCATION DETAILED: LEFT CENTRAL ZYGOMA
LOCATION DETAILED: LEFT MEDIAL SUPERIOR CHEST
LOCATION DETAILED: RIGHT SUPERIOR MEDIAL LOWER BACK
LOCATION DETAILED: STERNUM

## 2023-03-06 NOTE — PROCEDURE: LIQUID NITROGEN
Medical Necessity Clause: This procedure was medically necessary because the lesions that were treated were:
Render Note In Bullet Format When Appropriate: No
Spray Paint Text: The liquid nitrogen was applied to the skin utilizing a spray paint frosting technique.
Include Z78.9 (Other Specified Conditions Influencing Health Status) As An Associated Diagnosis?: Yes
Consent: The patient's consent was obtained including but not limited to risks of crusting, scabbing, blistering, scarring, darker or lighter pigmentary change, recurrence, incomplete removal and infection.
Detail Level: Simple
Number Of Freeze-Thaw Cycles: 3 freeze-thaw cycles
Medical Necessity Information: It is in your best interest to select a reason for this procedure from the list below. All of these items fulfill various CMS LCD requirements except the new and changing color options.
Post-Care Instructions: I reviewed with the patient in detail post-care instructions. Patient is to wear sunprotection, and avoid picking at any of the treated lesions. Pt may apply Vaseline to crusted or scabbing areas.
Detail Level: Zone
Duration Of Freeze Thaw-Cycle (Seconds): 5

## 2023-03-06 NOTE — PROCEDURE: BIOPSY BY SHAVE METHOD
Detail Level: Detailed
Depth Of Biopsy: dermis
Was A Bandage Applied: Yes
Size Of Lesion In Cm: 0
Biopsy Type: H and E
Biopsy Method: Dermablade
Anesthesia Type: 1% lidocaine with epinephrine and a 1:10 solution of 8.4% sodium bicarbonate
Anesthesia Volume In Cc: 0.5
Hemostasis: Drysol
Wound Care: Vaseline
Dressing: bandage
Destruction After The Procedure: No
Type Of Destruction Used: Curettage
Curettage Text: The wound bed was treated with curettage after the biopsy was performed.
Cryotherapy Text: The wound bed was treated with cryotherapy after the biopsy was performed.
Electrodesiccation Text: The wound bed was treated with electrodesiccation after the biopsy was performed.
Electrodesiccation And Curettage Text: The wound bed was treated with electrodesiccation and curettage after the biopsy was performed.
Silver Nitrate Text: The wound bed was treated with silver nitrate after the biopsy was performed.
Lab: [id number]
Consent: Written consent was obtained and risks were reviewed including but not limited to scarring, infection, bleeding, scabbing, incomplete removal, nerve damage and allergy to anesthesia.
Post-Care Instructions: I reviewed with the patient in detail post-care instructions. Patient is to keep the biopsy site dry overnight, and then apply bacitracin twice daily until healed. Patient may apply hydrogen peroxide soaks to remove any crusting.
Notification Instructions: Patient will be notified of biopsy results. However, patient instructed to call the office if not contacted within 2 weeks.
Billing Type: Third-Party Bill
Information: Selecting Yes will display possible errors in your note based on the variables you have selected. This validation is only offered as a suggestion for you. PLEASE NOTE THAT THE VALIDATION TEXT WILL BE REMOVED WHEN YOU FINALIZE YOUR NOTE. IF YOU WANT TO FAX A PRELIMINARY NOTE YOU WILL NEED TO TOGGLE THIS TO 'NO' IF YOU DO NOT WANT IT IN YOUR FAXED NOTE.

## 2023-09-06 ENCOUNTER — APPOINTMENT (RX ONLY)
Dept: URBAN - METROPOLITAN AREA TELEMEDICINE 2 | Facility: TELEMEDICINE | Age: 63
Setting detail: DERMATOLOGY
End: 2023-09-06

## 2023-09-06 DIAGNOSIS — L21.8 OTHER SEBORRHEIC DERMATITIS: ICD-10-CM

## 2023-09-06 DIAGNOSIS — L82.1 OTHER SEBORRHEIC KERATOSIS: ICD-10-CM

## 2023-09-06 DIAGNOSIS — D22 MELANOCYTIC NEVI: ICD-10-CM

## 2023-09-06 DIAGNOSIS — L57.3 POIKILODERMA OF CIVATTE: ICD-10-CM

## 2023-09-06 DIAGNOSIS — Z71.89 OTHER SPECIFIED COUNSELING: ICD-10-CM

## 2023-09-06 DIAGNOSIS — L57.0 ACTINIC KERATOSIS: ICD-10-CM

## 2023-09-06 DIAGNOSIS — L81.4 OTHER MELANIN HYPERPIGMENTATION: ICD-10-CM

## 2023-09-06 DIAGNOSIS — L72.0 EPIDERMAL CYST: ICD-10-CM

## 2023-09-06 PROBLEM — D22.61 MELANOCYTIC NEVI OF RIGHT UPPER LIMB, INCLUDING SHOULDER: Status: ACTIVE | Noted: 2023-09-06

## 2023-09-06 PROBLEM — D22.62 MELANOCYTIC NEVI OF LEFT UPPER LIMB, INCLUDING SHOULDER: Status: ACTIVE | Noted: 2023-09-06

## 2023-09-06 PROBLEM — D22.5 MELANOCYTIC NEVI OF TRUNK: Status: ACTIVE | Noted: 2023-09-06

## 2023-09-06 PROCEDURE — 17003 DESTRUCT PREMALG LES 2-14: CPT

## 2023-09-06 PROCEDURE — ? COUNSELING

## 2023-09-06 PROCEDURE — 17000 DESTRUCT PREMALG LESION: CPT

## 2023-09-06 PROCEDURE — ? LIQUID NITROGEN

## 2023-09-06 PROCEDURE — 99213 OFFICE O/P EST LOW 20 MIN: CPT | Mod: 25

## 2023-09-06 ASSESSMENT — LOCATION DETAILED DESCRIPTION DERM
LOCATION DETAILED: RIGHT VENTRAL DISTAL FOREARM
LOCATION DETAILED: LEFT CENTRAL ZYGOMA
LOCATION DETAILED: PERIUMBILICAL SKIN
LOCATION DETAILED: LEFT ANTERIOR DISTAL UPPER ARM
LOCATION DETAILED: RIGHT DISTAL DORSAL FOREARM
LOCATION DETAILED: LEFT CENTRAL MALAR CHEEK
LOCATION DETAILED: RIGHT LATERAL INFERIOR CHEST
LOCATION DETAILED: RIGHT ANTERIOR PROXIMAL UPPER ARM
LOCATION DETAILED: LEFT SUPERIOR MEDIAL BUCCAL CHEEK
LOCATION DETAILED: LEFT PROXIMAL ULNAR DORSAL FOREARM
LOCATION DETAILED: LEFT VENTRAL DISTAL FOREARM
LOCATION DETAILED: RIGHT ANTERIOR DISTAL UPPER ARM
LOCATION DETAILED: RIGHT LATERAL MALAR CHEEK
LOCATION DETAILED: RIGHT PROXIMAL DORSAL FOREARM
LOCATION DETAILED: LEFT SUPERIOR MEDIAL UPPER BACK
LOCATION DETAILED: LEFT ANTERIOR LATERAL PROXIMAL UPPER ARM
LOCATION DETAILED: INFERIOR THORACIC SPINE
LOCATION DETAILED: LEFT PROXIMAL DORSAL FOREARM
LOCATION DETAILED: LEFT POSTERIOR SHOULDER
LOCATION DETAILED: LEFT DORSAL WRIST
LOCATION DETAILED: LEFT MEDIAL INFERIOR CHEST
LOCATION DETAILED: RIGHT DISTAL POSTERIOR UPPER ARM
LOCATION DETAILED: RIGHT MEDIAL TRAPEZIAL NECK
LOCATION DETAILED: RIGHT SUPERIOR LATERAL BUCCAL CHEEK
LOCATION DETAILED: RIGHT MID-UPPER BACK
LOCATION DETAILED: RIGHT SUPERIOR CENTRAL MALAR CHEEK
LOCATION DETAILED: RIGHT SUPERIOR LATERAL LOWER BACK
LOCATION DETAILED: RIGHT INFERIOR ANTERIOR NECK

## 2023-09-06 ASSESSMENT — LOCATION SIMPLE DESCRIPTION DERM
LOCATION SIMPLE: RIGHT CHEEK
LOCATION SIMPLE: LEFT FOREARM
LOCATION SIMPLE: UPPER BACK
LOCATION SIMPLE: LEFT CHEEK
LOCATION SIMPLE: LEFT WRIST
LOCATION SIMPLE: RIGHT FOREARM
LOCATION SIMPLE: LEFT UPPER ARM
LOCATION SIMPLE: RIGHT ANTERIOR NECK
LOCATION SIMPLE: LEFT UPPER BACK
LOCATION SIMPLE: RIGHT UPPER ARM
LOCATION SIMPLE: POSTERIOR NECK
LOCATION SIMPLE: LEFT SHOULDER
LOCATION SIMPLE: RIGHT UPPER BACK
LOCATION SIMPLE: ABDOMEN
LOCATION SIMPLE: LEFT ZYGOMA
LOCATION SIMPLE: CHEST
LOCATION SIMPLE: RIGHT LOWER BACK

## 2023-09-06 ASSESSMENT — LOCATION ZONE DERM
LOCATION ZONE: ARM
LOCATION ZONE: NECK
LOCATION ZONE: TRUNK
LOCATION ZONE: FACE

## 2023-09-06 NOTE — PROCEDURE: LIQUID NITROGEN
Duration Of Freeze Thaw-Cycle (Seconds): 5
Post-Care Instructions: I reviewed with the patient in detail post-care instructions. Patient is to wear sunprotection, and avoid picking at any of the treated lesions. Pt may apply Vaseline to crusted or scabbing areas.
Show Aperture Variable?: Yes
Number Of Freeze-Thaw Cycles: 3 freeze-thaw cycles
Consent: The patient's consent was obtained including but not limited to risks of crusting, scabbing, blistering, scarring, darker or lighter pigmentary change, recurrence, incomplete removal and infection.
Detail Level: Zone
Render Note In Bullet Format When Appropriate: No